# Patient Record
Sex: FEMALE | Race: BLACK OR AFRICAN AMERICAN | NOT HISPANIC OR LATINO
[De-identification: names, ages, dates, MRNs, and addresses within clinical notes are randomized per-mention and may not be internally consistent; named-entity substitution may affect disease eponyms.]

---

## 2024-05-13 ENCOUNTER — DASHBOARD ENCOUNTERS (OUTPATIENT)
Age: 39
End: 2024-05-13

## 2024-05-13 ENCOUNTER — OFFICE VISIT (OUTPATIENT)
Dept: URBAN - METROPOLITAN AREA CLINIC 74 | Facility: CLINIC | Age: 39
End: 2024-05-13
Payer: COMMERCIAL

## 2024-05-13 ENCOUNTER — LAB OUTSIDE AN ENCOUNTER (OUTPATIENT)
Dept: URBAN - METROPOLITAN AREA CLINIC 74 | Facility: CLINIC | Age: 39
End: 2024-05-13

## 2024-05-13 VITALS
HEIGHT: 65 IN | WEIGHT: 217.4 LBS | BODY MASS INDEX: 36.22 KG/M2 | SYSTOLIC BLOOD PRESSURE: 128 MMHG | TEMPERATURE: 97.9 F | HEART RATE: 73 BPM | DIASTOLIC BLOOD PRESSURE: 80 MMHG

## 2024-05-13 DIAGNOSIS — R19.4 CHANGE IN BOWEL HABITS: ICD-10-CM

## 2024-05-13 DIAGNOSIS — R10.31 RLQ ABDOMINAL PAIN: ICD-10-CM

## 2024-05-13 DIAGNOSIS — Z80.0 FAMILY HISTORY OF COLON CANCER: ICD-10-CM

## 2024-05-13 DIAGNOSIS — R10.30 LOWER ABDOMINAL PAIN: ICD-10-CM

## 2024-05-13 DIAGNOSIS — K66.0 LOWER ABDOMINAL ADHESIONS: ICD-10-CM

## 2024-05-13 PROCEDURE — 99204 OFFICE O/P NEW MOD 45 MIN: CPT | Performed by: PHYSICIAN ASSISTANT

## 2024-05-13 RX ORDER — POLYETHYLENE GLYCOL 3350, SODIUM SULFATE, SODIUM CHLORIDE, POTASSIUM CHLORIDE, ASCORBIC ACID, SODIUM ASCORBATE 140-9-5.2G
AS DIRECTED KIT ORAL ONCE
Qty: 1 | Refills: 0 | OUTPATIENT
Start: 2024-05-13 | End: 2024-05-14

## 2024-05-13 RX ORDER — ATORVASTATIN CALCIUM 20 MG/1
1 TABLET TABLET, FILM COATED ORAL ONCE A DAY
Status: ACTIVE | COMMUNITY
Start: 2024-05-13

## 2024-05-13 RX ORDER — DICYCLOMINE HYDROCHLORIDE 20 MG/1
TABLET ORAL
Qty: 20 TABLET | Status: ACTIVE | COMMUNITY

## 2024-05-13 RX ORDER — ONDANSETRON 8 MG/1
TABLET, ORALLY DISINTEGRATING ORAL
Qty: 20 EACH | Status: ACTIVE | COMMUNITY

## 2024-05-13 RX ORDER — METFORMIN HYDROCHLORIDE 1000 MG/1
1 TABLET WITH EVENING MEAL TABLET, FILM COATED, EXTENDED RELEASE ORAL ONCE A DAY
Status: ACTIVE | COMMUNITY

## 2024-05-14 ENCOUNTER — WEB ENCOUNTER (OUTPATIENT)
Dept: URBAN - METROPOLITAN AREA CLINIC 74 | Facility: CLINIC | Age: 39
End: 2024-05-14

## 2024-05-15 ENCOUNTER — OUT OF OFFICE VISIT (OUTPATIENT)
Dept: URBAN - METROPOLITAN AREA SURGERY CENTER 30 | Facility: SURGERY CENTER | Age: 39
End: 2024-05-15
Payer: COMMERCIAL

## 2024-05-15 DIAGNOSIS — K64.0 GRADE I HEMORRHOIDS: ICD-10-CM

## 2024-05-15 DIAGNOSIS — Z12.11 COLON CANCER SCREENING (HIGH RISK): ICD-10-CM

## 2024-05-15 DIAGNOSIS — K64.0 GRADE I INTERNAL HEMORRHOIDS: ICD-10-CM

## 2024-05-15 DIAGNOSIS — Z80.0 FAMILY HISTORY OF COLON CANCER: ICD-10-CM

## 2024-05-15 DIAGNOSIS — Z12.11 COLON CANCER SCREENING: ICD-10-CM

## 2024-05-15 DIAGNOSIS — K64.4 RESIDUAL HEMORRHOIDAL SKIN TAG: ICD-10-CM

## 2024-05-15 PROCEDURE — 00812 ANES LWR INTST SCR COLSC: CPT | Performed by: NURSE ANESTHETIST, CERTIFIED REGISTERED

## 2024-05-15 PROCEDURE — 45378 DIAGNOSTIC COLONOSCOPY: CPT | Performed by: INTERNAL MEDICINE

## 2024-05-15 PROCEDURE — G8907 PT DOC NO EVENTS ON DISCHARG: HCPCS | Performed by: INTERNAL MEDICINE

## 2024-06-12 ENCOUNTER — OFFICE VISIT (OUTPATIENT)
Dept: URBAN - METROPOLITAN AREA CLINIC 74 | Facility: CLINIC | Age: 39
End: 2024-06-12
Payer: COMMERCIAL

## 2024-06-12 VITALS
HEART RATE: 83 BPM | HEIGHT: 65 IN | BODY MASS INDEX: 35.69 KG/M2 | DIASTOLIC BLOOD PRESSURE: 70 MMHG | TEMPERATURE: 98.1 F | SYSTOLIC BLOOD PRESSURE: 102 MMHG | WEIGHT: 214.2 LBS

## 2024-06-12 DIAGNOSIS — Z80.0 FAMILY HISTORY OF COLON CANCER: ICD-10-CM

## 2024-06-12 DIAGNOSIS — K64.8 INTERNAL HEMORRHOIDS: ICD-10-CM

## 2024-06-12 DIAGNOSIS — K58.2 IRRITABLE BOWEL SYNDROME WITH BOTH CONSTIPATION AND DIARRHEA: ICD-10-CM

## 2024-06-12 DIAGNOSIS — R14.0 BLOATING SYMPTOM: ICD-10-CM

## 2024-06-12 PROBLEM — 10743008: Status: ACTIVE | Noted: 2024-06-12

## 2024-06-12 PROCEDURE — 99213 OFFICE O/P EST LOW 20 MIN: CPT | Performed by: PHYSICIAN ASSISTANT

## 2024-06-12 RX ORDER — DICYCLOMINE HYDROCHLORIDE 20 MG/1
TABLET ORAL
Qty: 20 TABLET | Status: ACTIVE | COMMUNITY

## 2024-06-12 RX ORDER — ONDANSETRON 8 MG/1
TABLET, ORALLY DISINTEGRATING ORAL
Qty: 20 EACH | Status: ACTIVE | COMMUNITY

## 2024-06-12 RX ORDER — DICYCLOMINE HYDROCHLORIDE 20 MG/1
1 TABLET TABLET ORAL THREE TIMES A DAY
Qty: 270 TABLET | Refills: 3

## 2024-06-12 RX ORDER — ATORVASTATIN CALCIUM 20 MG/1
1 TABLET TABLET, FILM COATED ORAL ONCE A DAY
Status: ACTIVE | COMMUNITY
Start: 2024-05-13

## 2024-06-12 RX ORDER — METFORMIN HYDROCHLORIDE 1000 MG/1
1 TABLET WITH EVENING MEAL TABLET, FILM COATED, EXTENDED RELEASE ORAL ONCE A DAY
Status: ACTIVE | COMMUNITY

## 2024-06-12 RX ORDER — FLUOXETINE HYDROCHLORIDE 20 MG/1
1 CAPSULE CAPSULE ORAL ONCE A DAY
Status: ACTIVE | COMMUNITY

## 2024-06-12 NOTE — HPI-TODAY'S VISIT:
The patient is 38-year-old female with past medical history as noted below known to Dr. Childs is presenting to our clinic today to discuss her Colonoscopy results.  She is complaing of alternating bowel movement with abdominal cramping and gas. Her mother has IBS. She also more symptomatic after meals and graesy food.     Diagnostic studies: -- CT scan of abdomen and pelvis with IV contrast on 05/12/2024 no acute abnormality of abdomen or pelvis.  Subcentimeter hypodense liver lesion, too small to characterize but statistically likely a cyst or hemangioma in the patient with no history of malignancy.  Gallbladder, biliary duct, pancreas, spleen, adrenal glands, and kidneys are unremarkable.  -- Labs on 05/12/2024 CBC with WBC 5.8, hemoglobin 14.1, hematocrit 43.3, and platelet 282.  BMP with a BUN 15, creatinine 1.0, potassium 4.1, and sodium 138.  UA unremarkable.   Procedure: -- Colonoscopy on 05/15/2024 by Dr. Childs noted perianal skin tags found on perianal exam.  Non-bleeding internal hemorrhoids.  No specimen collected.  Repeat colonoscopy in 5 years for surveillance.

## 2024-09-11 ENCOUNTER — LAB OUTSIDE AN ENCOUNTER (OUTPATIENT)
Dept: URBAN - METROPOLITAN AREA CLINIC 74 | Facility: CLINIC | Age: 39
End: 2024-09-11

## 2024-09-11 ENCOUNTER — OFFICE VISIT (OUTPATIENT)
Dept: URBAN - METROPOLITAN AREA CLINIC 74 | Facility: CLINIC | Age: 39
End: 2024-09-11
Payer: COMMERCIAL

## 2024-09-11 VITALS
SYSTOLIC BLOOD PRESSURE: 108 MMHG | DIASTOLIC BLOOD PRESSURE: 70 MMHG | TEMPERATURE: 98.1 F | BODY MASS INDEX: 35.09 KG/M2 | HEIGHT: 65 IN | WEIGHT: 210.6 LBS | HEART RATE: 71 BPM

## 2024-09-11 DIAGNOSIS — R11.0 NAUSEA: ICD-10-CM

## 2024-09-11 DIAGNOSIS — K58.2 IRRITABLE BOWEL SYNDROME WITH BOTH CONSTIPATION AND DIARRHEA: ICD-10-CM

## 2024-09-11 DIAGNOSIS — K64.8 INTERNAL HEMORRHOIDS: ICD-10-CM

## 2024-09-11 DIAGNOSIS — R14.0 BLOATING SYMPTOM: ICD-10-CM

## 2024-09-11 PROCEDURE — 99214 OFFICE O/P EST MOD 30 MIN: CPT | Performed by: PHYSICIAN ASSISTANT

## 2024-09-11 RX ORDER — DICYCLOMINE HYDROCHLORIDE 20 MG/1
1 TABLET TABLET ORAL THREE TIMES A DAY
Qty: 270 TABLET | Refills: 3 | Status: ACTIVE | COMMUNITY

## 2024-09-11 RX ORDER — METFORMIN HYDROCHLORIDE 1000 MG/1
1 TABLET WITH EVENING MEAL TABLET, FILM COATED, EXTENDED RELEASE ORAL ONCE A DAY
Status: ACTIVE | COMMUNITY

## 2024-09-11 RX ORDER — ATORVASTATIN CALCIUM 20 MG/1
1 TABLET TABLET, FILM COATED ORAL ONCE A DAY
Status: ACTIVE | COMMUNITY
Start: 2024-05-13

## 2024-09-11 RX ORDER — PANTOPRAZOLE SODIUM 40 MG/1
1 TABLET TABLET, DELAYED RELEASE ORAL ONCE A DAY
Qty: 90 TABLET | Refills: 3 | OUTPATIENT
Start: 2024-09-11

## 2024-09-11 RX ORDER — DICYCLOMINE HYDROCHLORIDE 20 MG/1
1 TABLET TABLET ORAL THREE TIMES A DAY
Qty: 270 TABLET | Refills: 3
End: 2025-09-06

## 2024-09-11 RX ORDER — FLUOXETINE HYDROCHLORIDE 20 MG/1
1 CAPSULE CAPSULE ORAL ONCE A DAY
Status: ACTIVE | COMMUNITY

## 2024-09-11 RX ORDER — ONDANSETRON 8 MG/1
TABLET, ORALLY DISINTEGRATING ORAL
Qty: 20 EACH | Status: ACTIVE | COMMUNITY

## 2024-09-11 NOTE — HPI-TODAY'S VISIT:
The patient is 39-year-old female with past medical history as noted below known to Dr. Childs is presenting to our clinic today for follow up appointment. She was started on  Dicyclomine 20 mg one tablet every 8 hours last visit. The patient states that she has recently noted dyspepsia after meals, alternating diarrhea and constipation, abdominal cramping, and nausea. She is taking Dicyclomine 20 mg only as needed with some improvement.    Diagnostic studies: -- CT scan of abdomen and pelvis with IV contrast on 05/12/2024 no acute abnormality of abdomen or pelvis.  Subcentimeter hypodense liver lesion, too small to characterize but statistically likely a cyst or hemangioma in the patient with no history of malignancy.  Gallbladder, biliary duct, pancreas, spleen, adrenal glands, and kidneys are unremarkable.  -- Labs on 05/12/2024 CBC with WBC 5.8, hemoglobin 14.1, hematocrit 43.3, and platelet 282.  BMP with a BUN 15, creatinine 1.0, potassium 4.1, and sodium 138.  UA unremarkable.   Procedure: -- Colonoscopy on 05/15/2024 by Dr. Childs noted perianal skin tags found on perianal exam.  Non-bleeding internal hemorrhoids.  No specimen collected.  Repeat colonoscopy in 5 years for surveillance.

## 2024-09-19 ENCOUNTER — TELEPHONE ENCOUNTER (OUTPATIENT)
Dept: URBAN - METROPOLITAN AREA CLINIC 74 | Facility: CLINIC | Age: 39
End: 2024-09-19

## 2024-09-20 ENCOUNTER — CLAIMS CREATED FROM THE CLAIM WINDOW (OUTPATIENT)
Dept: URBAN - METROPOLITAN AREA SURGERY CENTER 30 | Facility: SURGERY CENTER | Age: 39
End: 2024-09-20
Payer: COMMERCIAL

## 2024-09-20 ENCOUNTER — CLAIMS CREATED FROM THE CLAIM WINDOW (OUTPATIENT)
Dept: URBAN - METROPOLITAN AREA CLINIC 4 | Facility: CLINIC | Age: 39
End: 2024-09-20
Payer: COMMERCIAL

## 2024-09-20 DIAGNOSIS — K21.9 ACID REFLUX: ICD-10-CM

## 2024-09-20 DIAGNOSIS — K31.89 OTHER DISEASES OF STOMACH AND DUODENUM: ICD-10-CM

## 2024-09-20 DIAGNOSIS — K29.70 GASTRITIS, UNSPECIFIED, WITHOUT BLEEDING: ICD-10-CM

## 2024-09-20 DIAGNOSIS — K29.70 REACTIVE GASTROPATHY: ICD-10-CM

## 2024-09-20 PROCEDURE — 88312 SPECIAL STAINS GROUP 1: CPT | Performed by: PATHOLOGY

## 2024-09-20 PROCEDURE — 00731 ANES UPR GI NDSC PX NOS: CPT | Performed by: NURSE ANESTHETIST, CERTIFIED REGISTERED

## 2024-09-20 PROCEDURE — 88305 TISSUE EXAM BY PATHOLOGIST: CPT | Performed by: PATHOLOGY

## 2024-09-20 PROCEDURE — 43239 EGD BIOPSY SINGLE/MULTIPLE: CPT | Performed by: INTERNAL MEDICINE

## 2024-09-21 ENCOUNTER — WEB ENCOUNTER (OUTPATIENT)
Dept: URBAN - METROPOLITAN AREA CLINIC 74 | Facility: CLINIC | Age: 39
End: 2024-09-21

## 2024-09-24 ENCOUNTER — TELEPHONE ENCOUNTER (OUTPATIENT)
Dept: URBAN - METROPOLITAN AREA CLINIC 74 | Facility: CLINIC | Age: 39
End: 2024-09-24

## 2024-10-08 PROBLEM — 8493009: Status: ACTIVE | Noted: 2024-10-08

## 2024-10-16 ENCOUNTER — OFFICE VISIT (OUTPATIENT)
Dept: URBAN - METROPOLITAN AREA CLINIC 74 | Facility: CLINIC | Age: 39
End: 2024-10-16

## 2024-10-16 NOTE — HPI-TODAY'S VISIT:
The patient is 39-year-old female with past medical history as noted below known to Dr. Childs is presenting to our clinic today to discuss EGD results. She continues on Pantoprazole 40 mg once daily and Dicyclomine 20 mg one tablet every 8 hours.   Diagnostic studies: -- CT scan of abdomen and pelvis with IV contrast on 05/12/2024 no acute abnormality of abdomen or pelvis.  Subcentimeter hypodense liver lesion, too small to characterize but statistically likely a cyst or hemangioma in the patient with no history of malignancy.  Gallbladder, biliary duct, pancreas, spleen, adrenal glands, and kidneys are unremarkable.  -- Labs on 05/12/2024 CBC with WBC 5.8, hemoglobin 14.1, hematocrit 43.3, and platelet 282.  BMP with a BUN 15, creatinine 1.0, potassium 4.1, and sodium 138.  UA unremarkable.   Procedures: EGD with biopsy on 09/2024 by Dr. Childs noted normal esophagus.  Chronic gastritis, characterized by congestion and erythema.  Normal examined duodenum.  Biopsy with chemical and reactive gastropathy.  No H.pylori organisms or intestinal metaplasia.  -- Colonoscopy on 05/15/2024 by Dr. Childs noted perianal skin tags found on perianal exam.  Non-bleeding internal hemorrhoids.  No specimen collected.  Repeat colonoscopy in 5 years for surveillance.

## 2024-10-23 ENCOUNTER — OFFICE VISIT (OUTPATIENT)
Dept: URBAN - METROPOLITAN AREA CLINIC 74 | Facility: CLINIC | Age: 39
End: 2024-10-23
Payer: COMMERCIAL

## 2024-10-23 VITALS
TEMPERATURE: 98.4 F | HEART RATE: 73 BPM | WEIGHT: 208.8 LBS | SYSTOLIC BLOOD PRESSURE: 108 MMHG | HEIGHT: 65 IN | DIASTOLIC BLOOD PRESSURE: 68 MMHG | BODY MASS INDEX: 34.79 KG/M2

## 2024-10-23 DIAGNOSIS — R14.0 BLOATING SYMPTOM: ICD-10-CM

## 2024-10-23 DIAGNOSIS — K58.2 IRRITABLE BOWEL SYNDROME WITH BOTH CONSTIPATION AND DIARRHEA: ICD-10-CM

## 2024-10-23 DIAGNOSIS — K29.50 MILD CHRONIC GASTRITIS: ICD-10-CM

## 2024-10-23 PROCEDURE — 99213 OFFICE O/P EST LOW 20 MIN: CPT | Performed by: PHYSICIAN ASSISTANT

## 2024-10-23 RX ORDER — METFORMIN HYDROCHLORIDE 1000 MG/1
1 TABLET WITH EVENING MEAL TABLET, FILM COATED, EXTENDED RELEASE ORAL ONCE A DAY
Status: ACTIVE | COMMUNITY

## 2024-10-23 RX ORDER — ONDANSETRON 8 MG/1
TABLET, ORALLY DISINTEGRATING ORAL
Qty: 20 EACH | Status: ACTIVE | COMMUNITY

## 2024-10-23 RX ORDER — FLUOXETINE HYDROCHLORIDE 20 MG/1
1 CAPSULE CAPSULE ORAL ONCE A DAY
Status: ACTIVE | COMMUNITY

## 2024-10-23 RX ORDER — DICYCLOMINE HYDROCHLORIDE 20 MG/1
1 TABLET TABLET ORAL THREE TIMES A DAY
Qty: 270 TABLET | Refills: 3 | Status: ACTIVE | COMMUNITY
End: 2025-09-06

## 2024-10-23 RX ORDER — ATORVASTATIN CALCIUM 20 MG/1
1 TABLET TABLET, FILM COATED ORAL ONCE A DAY
Status: ACTIVE | COMMUNITY
Start: 2024-05-13

## 2024-10-23 RX ORDER — FAMOTIDINE 40 MG/1
1 TABLET TABLET, FILM COATED ORAL TWICE A DAY
Qty: 180 TABLET | Refills: 3 | OUTPATIENT
Start: 2024-10-23

## 2024-10-23 RX ORDER — DICYCLOMINE HYDROCHLORIDE 20 MG/1
1 TABLET TABLET ORAL THREE TIMES A DAY
Qty: 270 TABLET | Refills: 3
End: 2025-10-18

## 2024-10-23 RX ORDER — PANTOPRAZOLE SODIUM 40 MG/1
1 TABLET TABLET, DELAYED RELEASE ORAL ONCE A DAY
Qty: 90 TABLET | Refills: 3 | Status: ON HOLD | COMMUNITY
Start: 2024-09-11

## 2024-10-23 NOTE — HPI-TODAY'S VISIT:
The patient is 39-year-old female with past medical history as noted below known to Dr. Childs is presenting to our clinic today to discuss EGD and US results. She is not taking Pantoprazole 40 mg once daily due to funny symptoms but she continues Dicyclomine 20 mg one tablet every 8 hours.   Diagnostic studies: -- RUQ US on 10/18/2024 noted unremarkable ultrasound right upper quadrant. No gallstones or gallbladder wall thickening. No pericholecystic fluid. Common duct is normal measuring 4 mm.  -- CT scan of abdomen and pelvis with IV contrast on 05/12/2024 no acute abnormality of abdomen or pelvis.  Subcentimeter hypodense liver lesion, too small to characterize but statistically likely a cyst or hemangioma in the patient with no history of malignancy.  Gallbladder, biliary duct, pancreas, spleen, adrenal glands, and kidneys are unremarkable.  -- Labs on 05/12/2024 CBC with WBC 5.8, hemoglobin 14.1, hematocrit 43.3, and platelet 282.  BMP with a BUN 15, creatinine 1.0, potassium 4.1, and sodium 138.  UA unremarkable.   Procedures: -- EGD with biopsy on 09/20/2024 by Dr. Childs noted normal esophagus.  Chronic gastritis, characterized by congestion and erythema.  Normal examined duodenum.  Biopsy with chemical and reactive gastropathy.  No H.pylori organisms or intestinal metaplasia.  -- Colonoscopy on 05/15/2024 by Dr. Childs noted perianal skin tags found on perianal exam.  Non-bleeding internal hemorrhoids.  No specimen collected.  Repeat colonoscopy in 5 years for surveillance.

## 2025-01-22 ENCOUNTER — OFFICE VISIT (OUTPATIENT)
Dept: URBAN - METROPOLITAN AREA TELEHEALTH 2 | Facility: TELEHEALTH | Age: 40
End: 2025-01-22
Payer: COMMERCIAL

## 2025-01-22 DIAGNOSIS — K29.50 MILD CHRONIC GASTRITIS: ICD-10-CM

## 2025-01-22 DIAGNOSIS — R14.0 BLOATING SYMPTOM: ICD-10-CM

## 2025-01-22 DIAGNOSIS — K58.2 IRRITABLE BOWEL SYNDROME WITH BOTH CONSTIPATION AND DIARRHEA: ICD-10-CM

## 2025-01-22 PROCEDURE — 99441 PHONE E/M BY PHYS 5-10 MIN: CPT | Performed by: PHYSICIAN ASSISTANT

## 2025-01-22 PROCEDURE — 99212 OFFICE O/P EST SF 10 MIN: CPT | Performed by: PHYSICIAN ASSISTANT

## 2025-01-22 RX ORDER — ONDANSETRON 8 MG/1
TABLET, ORALLY DISINTEGRATING ORAL
Qty: 20 EACH | Status: DISCONTINUED | COMMUNITY

## 2025-01-22 RX ORDER — FAMOTIDINE 40 MG/1
1 TABLET TABLET, FILM COATED ORAL TWICE A DAY
Qty: 180 TABLET | Refills: 3
Start: 2024-10-23

## 2025-01-22 RX ORDER — DICYCLOMINE HYDROCHLORIDE 20 MG/1
1 TABLET TABLET ORAL THREE TIMES A DAY
Qty: 270 TABLET | Refills: 3 | Status: ACTIVE | COMMUNITY
End: 2025-10-18

## 2025-01-22 RX ORDER — MULTIVIT-MIN/IRON/FOLIC ACID/K 18-600-40
1 CAPSULE CAPSULE ORAL ONCE A DAY
Status: ACTIVE | COMMUNITY

## 2025-01-22 RX ORDER — PANTOPRAZOLE SODIUM 40 MG/1
1 TABLET TABLET, DELAYED RELEASE ORAL ONCE A DAY
Qty: 90 TABLET | Refills: 3 | COMMUNITY
Start: 2024-09-11

## 2025-01-22 RX ORDER — METFORMIN HYDROCHLORIDE 1000 MG/1
1 TABLET WITH EVENING MEAL TABLET, FILM COATED, EXTENDED RELEASE ORAL ONCE A DAY
Status: ACTIVE | COMMUNITY

## 2025-01-22 RX ORDER — FAMOTIDINE 40 MG/1
1 TABLET TABLET, FILM COATED ORAL TWICE A DAY
Qty: 180 TABLET | Refills: 3 | Status: ACTIVE | COMMUNITY
Start: 2024-10-23

## 2025-01-22 RX ORDER — DICYCLOMINE HYDROCHLORIDE 20 MG/1
1 TABLET TABLET ORAL THREE TIMES A DAY
Qty: 270 TABLET | Refills: 3
End: 2026-01-17

## 2025-01-22 RX ORDER — FLUOXETINE HYDROCHLORIDE 20 MG/1
1 CAPSULE CAPSULE ORAL ONCE A DAY
Status: ACTIVE | COMMUNITY

## 2025-01-22 RX ORDER — ATORVASTATIN CALCIUM 20 MG/1
1 TABLET TABLET, FILM COATED ORAL ONCE A DAY
Status: ACTIVE | COMMUNITY
Start: 2024-05-13

## 2025-01-22 NOTE — HPI-TODAY'S VISIT:
The patient is 39-year-old female with past medical history as noted below known to Dr. Childs has telemedicine appointment today for refill on her medications. She continues on Famotidine 40 mg twice daily and Dicyclomine 20 mg one tablet every 8 hours. She is doing much better with medications and she has changed her diet. No new GI issues today.   Diagnostic studies: -- RUQ US on 10/18/2024 noted unremarkable ultrasound right upper quadrant. No gallstones or gallbladder wall thickening. No pericholecystic fluid. Common duct is normal measuring 4 mm.  -- CT scan of abdomen and pelvis with IV contrast on 05/12/2024 no acute abnormality of abdomen or pelvis.  Subcentimeter hypodense liver lesion, too small to characterize but statistically likely a cyst or hemangioma in the patient with no history of malignancy.  Gallbladder, biliary duct, pancreas, spleen, adrenal glands, and kidneys are unremarkable.   Procedures: -- EGD with biopsy on 09/20/2024 by Dr. Childs noted normal esophagus.  Chronic gastritis, characterized by congestion and erythema.  Normal examined duodenum.  Biopsy with chemical and reactive gastropathy.  No H.pylori organisms or intestinal metaplasia.  -- Colonoscopy on 05/15/2024 by Dr. Childs noted perianal skin tags found on perianal exam.  Non-bleeding internal hemorrhoids.  No specimen collected.  Repeat colonoscopy in 5 years for surveillance.